# Patient Record
Sex: FEMALE
[De-identification: names, ages, dates, MRNs, and addresses within clinical notes are randomized per-mention and may not be internally consistent; named-entity substitution may affect disease eponyms.]

---

## 2020-11-02 ENCOUNTER — HOSPITAL ENCOUNTER (OUTPATIENT)
Dept: HOSPITAL 88 - US | Age: 60
End: 2020-11-02
Attending: INTERNAL MEDICINE
Payer: COMMERCIAL

## 2020-11-02 DIAGNOSIS — K76.0: Primary | ICD-10-CM

## 2020-11-02 DIAGNOSIS — E66.9: ICD-10-CM

## 2020-11-02 DIAGNOSIS — Z71.3: ICD-10-CM

## 2020-11-02 LAB
DEPRECATED APTT PLAS QN: 29.1 SECONDS (ref 23.8–35.5)
DEPRECATED INR PLAS: 0.95
HGB BLD-MCNC: 15.2 G/DL (ref 12–16)
PLATELET # BLD AUTO: 297 X10E3/UL (ref 140–360)
PROTHROMBIN TIME: 13.2 SECONDS (ref 11.9–14.5)

## 2020-11-02 PROCEDURE — 47000 NEEDLE BIOPSY OF LIVER PERQ: CPT

## 2020-11-02 PROCEDURE — 85730 THROMBOPLASTIN TIME PARTIAL: CPT

## 2020-11-02 PROCEDURE — 88313 SPECIAL STAINS GROUP 2: CPT

## 2020-11-02 PROCEDURE — 76942 ECHO GUIDE FOR BIOPSY: CPT

## 2020-11-02 PROCEDURE — 85049 AUTOMATED PLATELET COUNT: CPT

## 2020-11-02 PROCEDURE — 36415 COLL VENOUS BLD VENIPUNCTURE: CPT

## 2020-11-02 PROCEDURE — 85610 PROTHROMBIN TIME: CPT

## 2020-11-02 PROCEDURE — 85014 HEMATOCRIT: CPT

## 2020-11-02 PROCEDURE — 88307 TISSUE EXAM BY PATHOLOGIST: CPT

## 2020-11-02 NOTE — DIAGNOSTIC IMAGING REPORT
PROCEDURE: Ultrasound-guided nonfocal liver biopsy



Procedural Personnel

Attending physician(s): Rhett Peter MD

Fellow physician(s): None

Resident physician(s): None

Advanced practice provider(s): None



Pre-procedure diagnosis: Elevated liver enzymes

Post-procedure diagnosis: Same

Indication: Organ dysfunction

Previous biopsy of same target (QCDR): No

Additional clinical history: None



Complications: No immediate complications.



IMPRESSION:



Ultrasound-guided biopsy of right liver.



Plan: 



Specimen(s) sent for evaluation.

_______________________________________________________________



PROCEDURE SUMMARY:

- Percutaneous US-guided nonfocal right liver core biopsy

- Additional procedure(s): None



PROCEDURE DETAILS:



Pre-procedure

Reference imaging for biopsy target: None

Consent: Informed consent for the procedure including risks, benefits and

alternatives was obtained and time-out was performed prior to the procedure.

Preparation: The site was prepared and draped using maximal sterile barrier

technique including cutaneous antisepsis.



Anesthesia/sedation

Level of anesthesia/sedation: Moderate sedation (conscious sedation) 1mg

Versed, 50mcg fentanyl

Anesthesia/sedation administered by: Independent trained observer under

attending supervision with continuous monitoring of the patient?s level of

consciousness and physiologic status

Total intra-service sedation time (minutes): 30



Imaging prior to biopsy

The patient was positioned supine. Initial ultrasound was performed.

Biopsy target:

- Maximal diameter (cm): NA

- Location: Right liver

Other findings: None



Biopsy 

Local anesthesia was administered. Under US guidance, the biopsy needle was

advanced to the target and biopsy was performed.

Coaxial needle: 17 gauge



Core needle biopsy device: Temno Evolution

Core needle size: 18 gauge

Number of core specimens: 3



Needle removal

The biopsy needle was removed and a sterile dressing was applied.

Tract embolization: None



Imaging following biopsy

Immediate post-biopsy ultrasound was performed.

Post-biopsy imaging findings: No hematoma



Additional Details

Additional description of procedure: None

Equipment details: None

Specimens removed: Biopsy samples as detailed above

Estimated blood loss (mL): Less than 10

Standardized report: [email or web address]



Attestation

Signer name: Rhett Peter MD

I attest that I was present for the entire procedure. I reviewed the stored

images and agree with the report as written.



Signed by: Rhett Peter MD on 11/2/2020 9:56 AM

## 2022-08-31 ENCOUNTER — HOSPITAL ENCOUNTER (OUTPATIENT)
Dept: HOSPITAL 88 - ER | Age: 62
Setting detail: OBSERVATION
LOS: 3 days | Discharge: HOME | End: 2022-09-03
Attending: INTERNAL MEDICINE | Admitting: INTERNAL MEDICINE
Payer: COMMERCIAL

## 2022-08-31 VITALS — DIASTOLIC BLOOD PRESSURE: 80 MMHG | SYSTOLIC BLOOD PRESSURE: 164 MMHG

## 2022-08-31 VITALS — WEIGHT: 178 LBS | HEIGHT: 59 IN | BODY MASS INDEX: 35.88 KG/M2

## 2022-08-31 VITALS — SYSTOLIC BLOOD PRESSURE: 164 MMHG | DIASTOLIC BLOOD PRESSURE: 80 MMHG

## 2022-08-31 DIAGNOSIS — Z20.822: ICD-10-CM

## 2022-08-31 DIAGNOSIS — E66.01: ICD-10-CM

## 2022-08-31 DIAGNOSIS — Z98.890: ICD-10-CM

## 2022-08-31 DIAGNOSIS — I10: ICD-10-CM

## 2022-08-31 DIAGNOSIS — E03.9: ICD-10-CM

## 2022-08-31 DIAGNOSIS — M06.9: ICD-10-CM

## 2022-08-31 DIAGNOSIS — K21.9: ICD-10-CM

## 2022-08-31 DIAGNOSIS — E87.6: ICD-10-CM

## 2022-08-31 DIAGNOSIS — R42: Primary | ICD-10-CM

## 2022-08-31 LAB
ALBUMIN SERPL-MCNC: 3.8 G/DL (ref 3.5–5)
ALBUMIN/GLOB SERPL: 1.7 {RATIO} (ref 0.8–2)
ALP SERPL-CCNC: 53 IU/L (ref 40–150)
ALT SERPL-CCNC: 45 IU/L (ref 0–55)
ANION GAP SERPL CALC-SCNC: 13.9 MMOL/L (ref 8–16)
BASOPHILS # BLD AUTO: 0.1 10*3/UL (ref 0–0.1)
BASOPHILS NFR BLD AUTO: 1.2 % (ref 0–1)
BUN SERPL-MCNC: 6 MG/DL (ref 7–26)
BUN/CREAT SERPL: 9 (ref 6–25)
CALCIUM SERPL-MCNC: 8.7 MG/DL (ref 8.4–10.2)
CHLORIDE SERPL-SCNC: 108 MMOL/L (ref 98–107)
CO2 SERPL-SCNC: 23 MMOL/L (ref 22–29)
DEPRECATED APTT PLAS QN: 28.2 SECONDS (ref 23.8–35.5)
DEPRECATED INR PLAS: 1
DEPRECATED NEUTROPHILS # BLD AUTO: 2 10*3/UL (ref 2.1–6.9)
EOSINOPHIL # BLD AUTO: 0.1 10*3/UL (ref 0–0.4)
EOSINOPHIL NFR BLD AUTO: 2.9 % (ref 0–6)
ERYTHROCYTE [DISTWIDTH] IN CORD BLOOD: 12.1 % (ref 11.7–14.4)
GLOBULIN PLAS-MCNC: 2.2 G/DL (ref 2.3–3.5)
GLUCOSE SERPLBLD-MCNC: 95 MG/DL (ref 74–118)
HCT VFR BLD AUTO: 39 % (ref 34.2–44.1)
HGB BLD-MCNC: 12.8 G/DL (ref 12–16)
LYMPHOCYTES # BLD: 2 10*3/UL (ref 1–3.2)
LYMPHOCYTES NFR BLD AUTO: 41.7 % (ref 18–39.1)
MCH RBC QN AUTO: 29.6 PG (ref 28–32)
MCHC RBC AUTO-ENTMCNC: 32.8 G/DL (ref 31–35)
MCV RBC AUTO: 90.3 FL (ref 81–99)
MONOCYTES # BLD AUTO: 0.7 10*3/UL (ref 0.2–0.8)
MONOCYTES NFR BLD AUTO: 14 % (ref 4.4–11.3)
NEUTS SEG NFR BLD AUTO: 40.2 % (ref 38.7–80)
PLATELET # BLD AUTO: 280 X10E3/UL (ref 140–360)
POTASSIUM SERPL-SCNC: 2.9 MMOL/L (ref 3.5–5.1)
PROTHROMBIN TIME: 14.1 SECONDS (ref 11.9–14.5)
RBC # BLD AUTO: 4.32 X10E6/UL (ref 3.6–5.1)
SODIUM SERPL-SCNC: 142 MMOL/L (ref 136–145)

## 2022-08-31 PROCEDURE — 80053 COMPREHEN METABOLIC PANEL: CPT

## 2022-08-31 PROCEDURE — 85025 COMPLETE CBC W/AUTO DIFF WBC: CPT

## 2022-08-31 PROCEDURE — 97530 THERAPEUTIC ACTIVITIES: CPT

## 2022-08-31 PROCEDURE — 36415 COLL VENOUS BLD VENIPUNCTURE: CPT

## 2022-08-31 PROCEDURE — 80048 BASIC METABOLIC PNL TOTAL CA: CPT

## 2022-08-31 PROCEDURE — 0223U NFCT DS 22 TRGT SARS-COV-2: CPT

## 2022-08-31 PROCEDURE — 99284 EMERGENCY DEPT VISIT MOD MDM: CPT

## 2022-08-31 PROCEDURE — 83735 ASSAY OF MAGNESIUM: CPT

## 2022-08-31 PROCEDURE — 97162 PT EVAL MOD COMPLEX 30 MIN: CPT

## 2022-08-31 PROCEDURE — 85730 THROMBOPLASTIN TIME PARTIAL: CPT

## 2022-08-31 PROCEDURE — 70450 CT HEAD/BRAIN W/O DYE: CPT

## 2022-08-31 PROCEDURE — 70551 MRI BRAIN STEM W/O DYE: CPT

## 2022-08-31 PROCEDURE — 97116 GAIT TRAINING THERAPY: CPT

## 2022-08-31 PROCEDURE — 85610 PROTHROMBIN TIME: CPT

## 2022-08-31 RX ADMIN — Medication PRN MG: at 22:04

## 2022-09-01 VITALS — DIASTOLIC BLOOD PRESSURE: 84 MMHG | SYSTOLIC BLOOD PRESSURE: 114 MMHG

## 2022-09-01 VITALS — SYSTOLIC BLOOD PRESSURE: 130 MMHG | DIASTOLIC BLOOD PRESSURE: 91 MMHG

## 2022-09-01 VITALS — DIASTOLIC BLOOD PRESSURE: 83 MMHG | SYSTOLIC BLOOD PRESSURE: 131 MMHG

## 2022-09-01 VITALS — SYSTOLIC BLOOD PRESSURE: 164 MMHG | DIASTOLIC BLOOD PRESSURE: 80 MMHG

## 2022-09-01 VITALS — SYSTOLIC BLOOD PRESSURE: 136 MMHG | DIASTOLIC BLOOD PRESSURE: 84 MMHG

## 2022-09-01 VITALS — SYSTOLIC BLOOD PRESSURE: 123 MMHG | DIASTOLIC BLOOD PRESSURE: 62 MMHG

## 2022-09-01 LAB
ANION GAP SERPL CALC-SCNC: 14.9 MMOL/L (ref 8–16)
BASOPHILS # BLD AUTO: 0.1 10*3/UL (ref 0–0.1)
BASOPHILS NFR BLD AUTO: 1.4 % (ref 0–1)
BUN SERPL-MCNC: 8 MG/DL (ref 7–26)
BUN/CREAT SERPL: 11 (ref 6–25)
CALCIUM SERPL-MCNC: 9.1 MG/DL (ref 8.4–10.2)
CHLORIDE SERPL-SCNC: 113 MMOL/L (ref 98–107)
CO2 SERPL-SCNC: 19 MMOL/L (ref 22–29)
DEPRECATED NEUTROPHILS # BLD AUTO: 1.1 10*3/UL (ref 2.1–6.9)
EOSINOPHIL # BLD AUTO: 0.2 10*3/UL (ref 0–0.4)
EOSINOPHIL NFR BLD AUTO: 5.2 % (ref 0–6)
ERYTHROCYTE [DISTWIDTH] IN CORD BLOOD: 12.2 % (ref 11.7–14.4)
GLUCOSE SERPLBLD-MCNC: 105 MG/DL (ref 74–118)
HCT VFR BLD AUTO: 39.7 % (ref 34.2–44.1)
HGB BLD-MCNC: 12.5 G/DL (ref 12–16)
LYMPHOCYTES # BLD: 2.3 10*3/UL (ref 1–3.2)
LYMPHOCYTES NFR BLD AUTO: 54.4 % (ref 18–39.1)
MCH RBC QN AUTO: 29.5 PG (ref 28–32)
MCHC RBC AUTO-ENTMCNC: 31.5 G/DL (ref 31–35)
MCV RBC AUTO: 93.6 FL (ref 81–99)
MONOCYTES # BLD AUTO: 0.6 10*3/UL (ref 0.2–0.8)
MONOCYTES NFR BLD AUTO: 14.4 % (ref 4.4–11.3)
NEUTS SEG NFR BLD AUTO: 24.6 % (ref 38.7–80)
PLATELET # BLD AUTO: 248 X10E3/UL (ref 140–360)
POTASSIUM SERPL-SCNC: 3.9 MMOL/L (ref 3.5–5.1)
RBC # BLD AUTO: 4.24 X10E6/UL (ref 3.6–5.1)
SODIUM SERPL-SCNC: 143 MMOL/L (ref 136–145)

## 2022-09-01 RX ADMIN — Medication PRN MG: at 13:32

## 2022-09-01 RX ADMIN — MECLIZINE HYDROCHLORIDE PRN MG: 12.5 TABLET ORAL at 13:32

## 2022-09-01 RX ADMIN — LIDOCAINE SCH EA: 50 PATCH CUTANEOUS at 13:32

## 2022-09-02 VITALS — SYSTOLIC BLOOD PRESSURE: 135 MMHG | DIASTOLIC BLOOD PRESSURE: 85 MMHG

## 2022-09-02 VITALS — DIASTOLIC BLOOD PRESSURE: 82 MMHG | SYSTOLIC BLOOD PRESSURE: 109 MMHG

## 2022-09-02 VITALS — SYSTOLIC BLOOD PRESSURE: 140 MMHG | DIASTOLIC BLOOD PRESSURE: 76 MMHG

## 2022-09-02 VITALS — SYSTOLIC BLOOD PRESSURE: 113 MMHG | DIASTOLIC BLOOD PRESSURE: 77 MMHG

## 2022-09-02 VITALS — SYSTOLIC BLOOD PRESSURE: 152 MMHG | DIASTOLIC BLOOD PRESSURE: 79 MMHG

## 2022-09-02 VITALS — SYSTOLIC BLOOD PRESSURE: 150 MMHG | DIASTOLIC BLOOD PRESSURE: 99 MMHG

## 2022-09-02 LAB
ANION GAP SERPL CALC-SCNC: 14.8 MMOL/L (ref 8–16)
BASOPHILS # BLD AUTO: 0.1 10*3/UL (ref 0–0.1)
BASOPHILS NFR BLD AUTO: 1.2 % (ref 0–1)
BUN SERPL-MCNC: 10 MG/DL (ref 7–26)
BUN/CREAT SERPL: 14 (ref 6–25)
CALCIUM SERPL-MCNC: 9.3 MG/DL (ref 8.4–10.2)
CHLORIDE SERPL-SCNC: 112 MMOL/L (ref 98–107)
CO2 SERPL-SCNC: 22 MMOL/L (ref 22–29)
DEPRECATED NEUTROPHILS # BLD AUTO: 1.3 10*3/UL (ref 2.1–6.9)
EOSINOPHIL # BLD AUTO: 0.3 10*3/UL (ref 0–0.4)
EOSINOPHIL NFR BLD AUTO: 6 % (ref 0–6)
ERYTHROCYTE [DISTWIDTH] IN CORD BLOOD: 12.1 % (ref 11.7–14.4)
GLUCOSE SERPLBLD-MCNC: 101 MG/DL (ref 74–118)
HCT VFR BLD AUTO: 38.2 % (ref 34.2–44.1)
HGB BLD-MCNC: 12.3 G/DL (ref 12–16)
LYMPHOCYTES # BLD: 2.1 10*3/UL (ref 1–3.2)
LYMPHOCYTES NFR BLD AUTO: 50.4 % (ref 18–39.1)
MCH RBC QN AUTO: 29.4 PG (ref 28–32)
MCHC RBC AUTO-ENTMCNC: 32.2 G/DL (ref 31–35)
MCV RBC AUTO: 91.2 FL (ref 81–99)
MONOCYTES # BLD AUTO: 0.5 10*3/UL (ref 0.2–0.8)
MONOCYTES NFR BLD AUTO: 12.3 % (ref 4.4–11.3)
NEUTS SEG NFR BLD AUTO: 30.1 % (ref 38.7–80)
PLATELET # BLD AUTO: 254 X10E3/UL (ref 140–360)
POTASSIUM SERPL-SCNC: 3.8 MMOL/L (ref 3.5–5.1)
RBC # BLD AUTO: 4.19 X10E6/UL (ref 3.6–5.1)
SODIUM SERPL-SCNC: 145 MMOL/L (ref 136–145)

## 2022-09-02 RX ADMIN — Medication PRN MG: at 13:29

## 2022-09-02 RX ADMIN — LIDOCAINE SCH EA: 50 PATCH CUTANEOUS at 09:21

## 2022-09-02 RX ADMIN — LEVOTHYROXINE SODIUM SCH MCG: 75 TABLET ORAL at 06:10

## 2022-09-02 RX ADMIN — Medication SCH MG: at 09:20

## 2022-09-02 RX ADMIN — MECLIZINE HYDROCHLORIDE PRN MG: 12.5 TABLET ORAL at 13:29

## 2022-09-03 VITALS — SYSTOLIC BLOOD PRESSURE: 147 MMHG | DIASTOLIC BLOOD PRESSURE: 72 MMHG

## 2022-09-03 VITALS — SYSTOLIC BLOOD PRESSURE: 142 MMHG | DIASTOLIC BLOOD PRESSURE: 82 MMHG

## 2022-09-03 VITALS — DIASTOLIC BLOOD PRESSURE: 82 MMHG | SYSTOLIC BLOOD PRESSURE: 142 MMHG

## 2022-09-03 VITALS — SYSTOLIC BLOOD PRESSURE: 139 MMHG | DIASTOLIC BLOOD PRESSURE: 70 MMHG

## 2022-09-03 RX ADMIN — Medication SCH MG: at 08:44

## 2022-09-03 RX ADMIN — LEVOTHYROXINE SODIUM SCH MCG: 75 TABLET ORAL at 05:28

## 2022-09-03 RX ADMIN — LIDOCAINE SCH EA: 50 PATCH CUTANEOUS at 08:44
